# Patient Record
Sex: FEMALE | Race: OTHER | NOT HISPANIC OR LATINO
[De-identification: names, ages, dates, MRNs, and addresses within clinical notes are randomized per-mention and may not be internally consistent; named-entity substitution may affect disease eponyms.]

---

## 2018-04-19 ENCOUNTER — APPOINTMENT (OUTPATIENT)
Dept: HEART AND VASCULAR | Facility: CLINIC | Age: 68
End: 2018-04-19

## 2018-05-07 ENCOUNTER — APPOINTMENT (OUTPATIENT)
Dept: HEART AND VASCULAR | Facility: CLINIC | Age: 68
End: 2018-05-07

## 2018-06-21 ENCOUNTER — APPOINTMENT (OUTPATIENT)
Dept: HEART AND VASCULAR | Facility: CLINIC | Age: 68
End: 2018-06-21
Payer: COMMERCIAL

## 2018-06-21 VITALS
HEART RATE: 71 BPM | SYSTOLIC BLOOD PRESSURE: 120 MMHG | HEIGHT: 69 IN | DIASTOLIC BLOOD PRESSURE: 70 MMHG | BODY MASS INDEX: 30.81 KG/M2 | WEIGHT: 208 LBS

## 2018-06-21 DIAGNOSIS — I34.0 NONRHEUMATIC MITRAL (VALVE) INSUFFICIENCY: ICD-10-CM

## 2018-06-21 DIAGNOSIS — K21.9 GASTRO-ESOPHAGEAL REFLUX DISEASE W/OUT ESOPHAGITIS: ICD-10-CM

## 2018-06-21 PROCEDURE — 99204 OFFICE O/P NEW MOD 45 MIN: CPT | Mod: 25

## 2018-06-21 PROCEDURE — 93306 TTE W/DOPPLER COMPLETE: CPT

## 2018-06-21 PROCEDURE — 93000 ELECTROCARDIOGRAM COMPLETE: CPT

## 2018-07-11 ENCOUNTER — FORM ENCOUNTER (OUTPATIENT)
Age: 68
End: 2018-07-11

## 2018-07-12 ENCOUNTER — APPOINTMENT (OUTPATIENT)
Dept: CT IMAGING | Facility: HOSPITAL | Age: 68
End: 2018-07-12
Payer: COMMERCIAL

## 2018-07-12 ENCOUNTER — OUTPATIENT (OUTPATIENT)
Dept: OUTPATIENT SERVICES | Facility: HOSPITAL | Age: 68
LOS: 1 days | End: 2018-07-12
Payer: COMMERCIAL

## 2018-07-12 PROCEDURE — 75574 CT ANGIO HRT W/3D IMAGE: CPT | Mod: 26

## 2018-07-12 PROCEDURE — 75574 CT ANGIO HRT W/3D IMAGE: CPT

## 2018-07-31 ENCOUNTER — APPOINTMENT (OUTPATIENT)
Dept: PULMONOLOGY | Facility: CLINIC | Age: 68
End: 2018-07-31
Payer: COMMERCIAL

## 2018-07-31 VITALS
HEIGHT: 68 IN | SYSTOLIC BLOOD PRESSURE: 130 MMHG | DIASTOLIC BLOOD PRESSURE: 100 MMHG | HEART RATE: 80 BPM | WEIGHT: 207 LBS | BODY MASS INDEX: 31.37 KG/M2 | RESPIRATION RATE: 12 BRPM | TEMPERATURE: 98.2 F | OXYGEN SATURATION: 98 %

## 2018-07-31 DIAGNOSIS — S04.899A: ICD-10-CM

## 2018-07-31 DIAGNOSIS — Z82.49 FAMILY HISTORY OF ISCHEMIC HEART DISEASE AND OTHER DISEASES OF THE CIRCULATORY SYSTEM: ICD-10-CM

## 2018-07-31 DIAGNOSIS — Z80.0 FAMILY HISTORY OF MALIGNANT NEOPLASM OF DIGESTIVE ORGANS: ICD-10-CM

## 2018-07-31 DIAGNOSIS — J45.20 MILD INTERMITTENT ASTHMA, UNCOMPLICATED: ICD-10-CM

## 2018-07-31 DIAGNOSIS — G56.80 OTHER SPECIFIED MONONEUROPATHIES OF UNSPECIFIED UPPER LIMB: ICD-10-CM

## 2018-07-31 DIAGNOSIS — J32.9 CHRONIC SINUSITIS, UNSPECIFIED: ICD-10-CM

## 2018-07-31 DIAGNOSIS — Z82.3 FAMILY HISTORY OF STROKE: ICD-10-CM

## 2018-07-31 PROCEDURE — 99204 OFFICE O/P NEW MOD 45 MIN: CPT | Mod: 25

## 2018-07-31 PROCEDURE — 94010 BREATHING CAPACITY TEST: CPT

## 2018-07-31 PROCEDURE — 94727 GAS DIL/WSHOT DETER LNG VOL: CPT

## 2018-07-31 PROCEDURE — 94729 DIFFUSING CAPACITY: CPT

## 2018-08-09 ENCOUNTER — OTHER (OUTPATIENT)
Age: 68
End: 2018-08-09

## 2018-08-23 ENCOUNTER — CLINICAL ADVICE (OUTPATIENT)
Age: 68
End: 2018-08-23

## 2018-09-03 ENCOUNTER — FORM ENCOUNTER (OUTPATIENT)
Age: 68
End: 2018-09-03

## 2018-09-04 ENCOUNTER — OUTPATIENT (OUTPATIENT)
Dept: OUTPATIENT SERVICES | Facility: HOSPITAL | Age: 68
LOS: 1 days | End: 2018-09-04
Payer: COMMERCIAL

## 2018-09-04 ENCOUNTER — APPOINTMENT (OUTPATIENT)
Dept: PULMONOLOGY | Facility: CLINIC | Age: 68
End: 2018-09-04
Payer: COMMERCIAL

## 2018-09-04 ENCOUNTER — APPOINTMENT (OUTPATIENT)
Dept: CT IMAGING | Facility: HOSPITAL | Age: 68
End: 2018-09-04
Payer: COMMERCIAL

## 2018-09-04 VITALS
SYSTOLIC BLOOD PRESSURE: 140 MMHG | DIASTOLIC BLOOD PRESSURE: 90 MMHG | TEMPERATURE: 98 F | WEIGHT: 201.5 LBS | OXYGEN SATURATION: 98 % | HEART RATE: 71 BPM | BODY MASS INDEX: 30.54 KG/M2 | HEIGHT: 68 IN

## 2018-09-04 DIAGNOSIS — Z87.891 PERSONAL HISTORY OF NICOTINE DEPENDENCE: ICD-10-CM

## 2018-09-04 PROCEDURE — G0297: CPT

## 2018-09-04 PROCEDURE — G0297: CPT | Mod: 26

## 2018-09-04 PROCEDURE — G0296 VISIT TO DETERM LDCT ELIG: CPT

## 2018-09-10 ENCOUNTER — RESULT REVIEW (OUTPATIENT)
Age: 68
End: 2018-09-10

## 2018-09-10 DIAGNOSIS — R91.8 OTHER NONSPECIFIC ABNORMAL FINDING OF LUNG FIELD: ICD-10-CM

## 2018-09-11 PROBLEM — R91.8 MULTIPLE PULMONARY NODULES: Status: ACTIVE | Noted: 2018-09-11

## 2018-12-04 ENCOUNTER — NON-APPOINTMENT (OUTPATIENT)
Age: 68
End: 2018-12-04

## 2018-12-04 ENCOUNTER — APPOINTMENT (OUTPATIENT)
Dept: HEART AND VASCULAR | Facility: CLINIC | Age: 68
End: 2018-12-04
Payer: COMMERCIAL

## 2018-12-04 VITALS
WEIGHT: 211 LBS | BODY MASS INDEX: 31.98 KG/M2 | HEIGHT: 68 IN | HEART RATE: 73 BPM | DIASTOLIC BLOOD PRESSURE: 72 MMHG | SYSTOLIC BLOOD PRESSURE: 110 MMHG

## 2018-12-04 PROCEDURE — 99214 OFFICE O/P EST MOD 30 MIN: CPT | Mod: 25

## 2018-12-04 PROCEDURE — 93000 ELECTROCARDIOGRAM COMPLETE: CPT

## 2018-12-04 NOTE — DISCUSSION/SUMMARY
[FreeTextEntry1] : EKG:NSR,WNL\par cont Crestor for lipids/ elevated calcium score; norvasc for hptn. ? 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 advised her to bring in her BP machine at next visit.\par Her cardiac status is stable enough to permit Cataract Surgery

## 2018-12-04 NOTE — REVIEW OF SYSTEMS
[Fever] : no fever [Headache] : no headache [Recent Weight Gain (___ Lbs)] : no recent weight gain [Chills] : no chills [Feeling Fatigued] : feeling fatigued [Recent Weight Loss (___ Lbs)] : no recent weight loss [Blurry Vision] : blurred vision [Eyeglasses] : currently wearing eyeglasses [Loss Of Hearing] : hearing loss [Lower Ext Edema] : lower extremity edema [Cough] : no cough [Wheezing] : wheezing [Coughing Up Blood] : no hemoptysis [Abdominal Pain] : no abdominal pain [Nausea] : no nausea [Vomiting] : no vomiting [Heartburn] : heartburn [Change in Appetite] : no change in appetite [Dysphagia] : no dysphagia [Dysuria] : no dysuria [Joint Pain] : joint pain [Joint Swelling] : no joint swelling [Joint Stiffness] : no joint stiffness [see HPI] : see HPI [Dizziness] : no dizziness [Numbness (Hypesthesia)] : numbness [Tingling (Paresthesia)] : tingling [Confusion] : no confusion was observed [Memory Lapses Or Loss] : memory lapses or loss [Depression] : no depression [Anxiety] : no anxiety [Under Stress] : under stress [Suicidal] : not suicidal [Negative] : Heme/Lymph

## 2018-12-04 NOTE — HISTORY OF PRESENT ILLNESS
[FreeTextEntry1] : No cp,sob. for Left cataract surgery. to see PCP and get labs. she saw Dr Dozier for dilated PA. Gets occasional leg swelling and leg numbness- has L/S problems. states at times her BP is occasionally high

## 2018-12-04 NOTE — PHYSICAL EXAM
[General Appearance - Well Developed] : well developed [Normal Appearance] : normal appearance [Well Groomed] : well groomed [General Appearance - Well Nourished] : well nourished [No Deformities] : no deformities [General Appearance - In No Acute Distress] : no acute distress [Normal Conjunctiva] : the conjunctiva exhibited no abnormalities [Normal Oral Mucosa] : normal oral mucosa [Normal Jugular Venous A Waves Present] : normal jugular venous A waves present [Normal Jugular Venous V Waves Present] : normal jugular venous V waves present [No Jugular Venous Knight A Waves] : no jugular venous knight A waves [FreeTextEntry1] : carotids w/o bruits [] : no respiratory distress [Respiration, Rhythm And Depth] : normal respiratory rhythm and effort [Exaggerated Use Of Accessory Muscles For Inspiration] : no accessory muscle use [Auscultation Breath Sounds / Voice Sounds] : lungs were clear to auscultation bilaterally [Heart Rate And Rhythm] : heart rate and rhythm were normal [Heart Sounds] : normal S1 and S2 [Murmurs] : no murmurs present [Arterial Pulses Normal] : the arterial pulses were normal [Edema] : no peripheral edema present [Bowel Sounds] : normal bowel sounds [Abdomen Soft] : soft [Abdomen Tenderness] : non-tender [Abnormal Walk] : normal gait [Nail Clubbing] : no clubbing of the fingernails [Skin Color & Pigmentation] : normal skin color and pigmentation [Oriented To Time, Place, And Person] : oriented to person, place, and time

## 2018-12-19 ENCOUNTER — APPOINTMENT (OUTPATIENT)
Dept: HEART AND VASCULAR | Facility: CLINIC | Age: 68
End: 2018-12-19
Payer: COMMERCIAL

## 2018-12-19 VITALS
SYSTOLIC BLOOD PRESSURE: 130 MMHG | HEART RATE: 74 BPM | DIASTOLIC BLOOD PRESSURE: 80 MMHG | BODY MASS INDEX: 32.23 KG/M2 | WEIGHT: 212 LBS

## 2018-12-19 DIAGNOSIS — I83.899 VARICOSE VEINS OF UNSPECIFIED LOWER EXTREMITY WITH OTHER COMPLICATIONS: ICD-10-CM

## 2018-12-19 DIAGNOSIS — M79.604 PAIN IN RIGHT LEG: ICD-10-CM

## 2018-12-19 DIAGNOSIS — R20.0 ANESTHESIA OF SKIN: ICD-10-CM

## 2018-12-19 DIAGNOSIS — M79.605 PAIN IN RIGHT LEG: ICD-10-CM

## 2018-12-19 PROCEDURE — 93970 EXTREMITY STUDY: CPT

## 2018-12-19 PROCEDURE — 99214 OFFICE O/P EST MOD 30 MIN: CPT | Mod: 25

## 2019-01-06 PROBLEM — M79.604 LEG PAIN, BILATERAL: Status: ACTIVE | Noted: 2019-01-06

## 2019-01-06 PROBLEM — I83.899 SYMPTOMATIC VARICOSE VEINS: Status: ACTIVE | Noted: 2019-01-06

## 2019-01-06 PROBLEM — R20.0 LEG NUMBNESS: Status: ACTIVE | Noted: 2019-01-06

## 2019-06-03 ENCOUNTER — APPOINTMENT (OUTPATIENT)
Dept: HEART AND VASCULAR | Facility: CLINIC | Age: 69
End: 2019-06-03

## 2019-09-14 ENCOUNTER — APPOINTMENT (OUTPATIENT)
Dept: CT IMAGING | Facility: HOSPITAL | Age: 69
End: 2019-09-14

## 2019-09-22 ENCOUNTER — FORM ENCOUNTER (OUTPATIENT)
Age: 69
End: 2019-09-22

## 2019-09-23 ENCOUNTER — APPOINTMENT (OUTPATIENT)
Dept: CT IMAGING | Facility: HOSPITAL | Age: 69
End: 2019-09-23
Payer: COMMERCIAL

## 2019-09-23 ENCOUNTER — OUTPATIENT (OUTPATIENT)
Dept: OUTPATIENT SERVICES | Facility: HOSPITAL | Age: 69
LOS: 1 days | End: 2019-09-23
Payer: COMMERCIAL

## 2019-09-23 PROCEDURE — 71250 CT THORAX DX C-: CPT

## 2019-09-23 PROCEDURE — 71250 CT THORAX DX C-: CPT | Mod: 26

## 2019-09-24 ENCOUNTER — APPOINTMENT (OUTPATIENT)
Dept: HEART AND VASCULAR | Facility: CLINIC | Age: 69
End: 2019-09-24
Payer: COMMERCIAL

## 2019-09-24 ENCOUNTER — NON-APPOINTMENT (OUTPATIENT)
Age: 69
End: 2019-09-24

## 2019-09-24 VITALS
BODY MASS INDEX: 31.67 KG/M2 | SYSTOLIC BLOOD PRESSURE: 130 MMHG | DIASTOLIC BLOOD PRESSURE: 86 MMHG | WEIGHT: 209 LBS | HEART RATE: 73 BPM | OXYGEN SATURATION: 97 % | HEIGHT: 68 IN

## 2019-09-24 VITALS — SYSTOLIC BLOOD PRESSURE: 112 MMHG | DIASTOLIC BLOOD PRESSURE: 78 MMHG

## 2019-09-24 PROCEDURE — 99214 OFFICE O/P EST MOD 30 MIN: CPT | Mod: 25

## 2019-09-24 PROCEDURE — 93000 ELECTROCARDIOGRAM COMPLETE: CPT

## 2019-09-24 NOTE — DISCUSSION/SUMMARY
[FreeTextEntry1] : EKG:NSR,WNL.\par Feel her boothe is due to weight/ pulmonary( former smoker) f/u with Dr Dozier\par echo for BOOTHE\par get labs done recently from PCP( given elevated calcium score her target LDL. 70mg%\par cont Crestor for lipids. repeat UP=285/78 and cont norvasc for HPTN

## 2019-09-24 NOTE — PHYSICAL EXAM
[General Appearance - Well Developed] : well developed [Normal Appearance] : normal appearance [Well Groomed] : well groomed In an event that you cannot reach your surgeon you can call [phone] to page the pediatric surgical resident or in an emergency go to the closest ER. If you have any questions you may contact the [facility]  [phone] mon-fri 6a-4p. [General Appearance - Well Nourished] : well nourished [No Deformities] : no deformities [General Appearance - In No Acute Distress] : no acute distress [Normal Oral Mucosa] : normal oral mucosa [Normal Conjunctiva] : the conjunctiva exhibited no abnormalities [Normal Jugular Venous A Waves Present] : normal jugular venous A waves present [No Jugular Venous Knight A Waves] : no jugular venous knight A waves [Normal Jugular Venous V Waves Present] : normal jugular venous V waves present [] : no respiratory distress [Respiration, Rhythm And Depth] : normal respiratory rhythm and effort [Exaggerated Use Of Accessory Muscles For Inspiration] : no accessory muscle use [Auscultation Breath Sounds / Voice Sounds] : lungs were clear to auscultation bilaterally [Heart Sounds] : normal S1 and S2 [Heart Rate And Rhythm] : heart rate and rhythm were normal [Murmurs] : no murmurs present [Arterial Pulses Normal] : the arterial pulses were normal [Bowel Sounds] : normal bowel sounds [Edema] : no peripheral edema present [Abdomen Soft] : soft [Abdomen Tenderness] : non-tender [Abnormal Walk] : normal gait [Nail Clubbing] : no clubbing of the fingernails [Skin Color & Pigmentation] : normal skin color and pigmentation [Oriented To Time, Place, And Person] : oriented to person, place, and time [FreeTextEntry1] : carotids w/o bruits

## 2019-09-24 NOTE — REVIEW OF SYSTEMS
[Headache] : headache [Feeling Fatigued] : feeling fatigued [Blurry Vision] : blurred vision [Eyeglasses] : currently wearing eyeglasses [Loss Of Hearing] : hearing loss [Sinus Pressure] : sinus pressure [Lower Ext Edema] : lower extremity edema [Wheezing] : wheezing [Cough] : cough [Heartburn] : heartburn [Joint Pain] : joint pain [see HPI] : see HPI [Numbness (Hypesthesia)] : numbness [Tingling (Paresthesia)] : tingling [Memory Lapses Or Loss] : memory lapses or loss [Under Stress] : under stress [Negative] : Heme/Lymph [Recent Weight Gain (___ Lbs)] : no recent weight gain [Fever] : no fever [Chills] : no chills [Recent Weight Loss (___ Lbs)] : no recent weight loss [Coughing Up Blood] : no hemoptysis [Abdominal Pain] : no abdominal pain [Vomiting] : no vomiting [Nausea] : no nausea [Change in Appetite] : no change in appetite [Dysphagia] : no dysphagia [Dysuria] : no dysuria [Joint Stiffness] : no joint stiffness [Joint Swelling] : no joint swelling [Confusion] : no confusion was observed [Dizziness] : no dizziness [Anxiety] : no anxiety [Depression] : no depression [Suicidal] : not suicidal

## 2019-09-24 NOTE — HISTORY OF PRESENT ILLNESS
[FreeTextEntry1] : walked here from [facility]- gets a bit winded with stairs, no cp\par c/o numbness in both feet(L.R) constant. has L/S disease

## 2020-06-23 ENCOUNTER — APPOINTMENT (OUTPATIENT)
Dept: HEART AND VASCULAR | Facility: CLINIC | Age: 70
End: 2020-06-23
Payer: COMMERCIAL

## 2020-06-23 ENCOUNTER — NON-APPOINTMENT (OUTPATIENT)
Age: 70
End: 2020-06-23

## 2020-06-23 VITALS
TEMPERATURE: 97.8 F | SYSTOLIC BLOOD PRESSURE: 132 MMHG | WEIGHT: 208 LBS | BODY MASS INDEX: 31.52 KG/M2 | HEIGHT: 68 IN | HEART RATE: 68 BPM | DIASTOLIC BLOOD PRESSURE: 90 MMHG

## 2020-06-23 DIAGNOSIS — R94.31 ABNORMAL ELECTROCARDIOGRAM [ECG] [EKG]: ICD-10-CM

## 2020-06-23 PROCEDURE — 99214 OFFICE O/P EST MOD 30 MIN: CPT | Mod: 25

## 2020-06-23 PROCEDURE — 93000 ELECTROCARDIOGRAM COMPLETE: CPT

## 2020-06-23 RX ORDER — ALPRAZOLAM 2 MG/1
TABLET ORAL
Refills: 0 | Status: DISCONTINUED | COMMUNITY
End: 2020-06-23

## 2020-06-23 NOTE — HISTORY OF PRESENT ILLNESS
[FreeTextEntry1] : Have COVIDS early April- no sob- her BP was elevated and her PCP increased Norvasc about 2 weeks ago. gets her (chronic chest pressure) when stressed (has it for years)

## 2020-06-23 NOTE — REVIEW OF SYSTEMS
[Fever] : no fever [Headache] : headache [Recent Weight Gain (___ Lbs)] : no recent weight gain [Feeling Fatigued] : not feeling fatigued [Chills] : no chills [Recent Weight Loss (___ Lbs)] : no recent weight loss [see HPI] : see HPI [Blurry Vision] : no blurred vision [Eyeglasses] : currently wearing eyeglasses [Loss Of Hearing] : hearing loss [Chest  Pressure] : chest pressure [Sinus Pressure] : no sinus pressure [Lower Ext Edema] : no extremity edema [Cough] : no cough [Wheezing] : no wheezing [Coughing Up Blood] : no hemoptysis [Nausea] : no nausea [Abdominal Pain] : no abdominal pain [Vomiting] : no vomiting [Heartburn] : heartburn [Dysphagia] : no dysphagia [Dysuria] : no dysuria [Change in Appetite] : no change in appetite [Joint Swelling] : no joint swelling [Joint Pain] : joint pain [Joint Stiffness] : no joint stiffness [Dizziness] : no dizziness [Numbness (Hypesthesia)] : no numbness [Tingling (Paresthesia)] : tingling [Memory Lapses Or Loss] : memory lapses or loss [Confusion] : no confusion was observed [Under Stress] : under stress [Anxiety] : anxiety [Depression] : no depression [Suicidal] : not suicidal [Negative] : Heme/Lymph

## 2020-06-23 NOTE — PHYSICAL EXAM
[Well Groomed] : well groomed [General Appearance - Well Developed] : well developed [Normal Appearance] : normal appearance [General Appearance - Well Nourished] : well nourished [No Deformities] : no deformities [General Appearance - In No Acute Distress] : no acute distress [Normal Oral Mucosa] : normal oral mucosa [Normal Conjunctiva] : the conjunctiva exhibited no abnormalities [Normal Jugular Venous A Waves Present] : normal jugular venous A waves present [Normal Jugular Venous V Waves Present] : normal jugular venous V waves present [No Jugular Venous Knight A Waves] : no jugular venous knight A waves [FreeTextEntry1] : carotids w/o bruits [] : no respiratory distress [Respiration, Rhythm And Depth] : normal respiratory rhythm and effort [Exaggerated Use Of Accessory Muscles For Inspiration] : no accessory muscle use [Auscultation Breath Sounds / Voice Sounds] : lungs were clear to auscultation bilaterally [Heart Rate And Rhythm] : heart rate and rhythm were normal [Heart Sounds] : normal S1 and S2 [Murmurs] : no murmurs present [Arterial Pulses Normal] : the arterial pulses were normal [Edema] : no peripheral edema present [Abdomen Soft] : soft [Bowel Sounds] : normal bowel sounds [Abdomen Tenderness] : non-tender [Abnormal Walk] : normal gait [Nail Clubbing] : no clubbing of the fingernails [Skin Color & Pigmentation] : normal skin color and pigmentation [Oriented To Time, Place, And Person] : oriented to person, place, and time

## 2020-06-23 NOTE — DISCUSSION/SUMMARY
[FreeTextEntry1] : EKG:NSR, inferior Q waves(mild change)\par BP high- will add ACE and cont norvasc for hPTN; Crestor for lipids- will get nuc stress for CP( doubt ischemia- neg CCTA)

## 2020-06-27 ENCOUNTER — HOSPITAL ENCOUNTER (EMERGENCY)
Dept: HOSPITAL 74 - JER | Age: 70
Discharge: HOME | End: 2020-06-27
Payer: COMMERCIAL

## 2020-06-27 VITALS — DIASTOLIC BLOOD PRESSURE: 68 MMHG | SYSTOLIC BLOOD PRESSURE: 138 MMHG | HEART RATE: 82 BPM

## 2020-06-27 VITALS — TEMPERATURE: 98 F

## 2020-06-27 VITALS — BODY MASS INDEX: 31.6 KG/M2

## 2020-06-27 DIAGNOSIS — R51: Primary | ICD-10-CM
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
## 2020-07-16 ENCOUNTER — APPOINTMENT (OUTPATIENT)
Dept: HEART AND VASCULAR | Facility: CLINIC | Age: 70
End: 2020-07-16
Payer: COMMERCIAL

## 2020-07-16 VITALS
WEIGHT: 207 LBS | HEIGHT: 68 IN | BODY MASS INDEX: 31.37 KG/M2 | HEART RATE: 81 BPM | SYSTOLIC BLOOD PRESSURE: 118 MMHG | DIASTOLIC BLOOD PRESSURE: 84 MMHG

## 2020-07-16 VITALS — TEMPERATURE: 97.7 F

## 2020-07-16 DIAGNOSIS — I37.1 NONRHEUMATIC PULMONARY VALVE INSUFFICIENCY: ICD-10-CM

## 2020-07-16 DIAGNOSIS — I51.7 CARDIOMEGALY: ICD-10-CM

## 2020-07-16 PROCEDURE — 99213 OFFICE O/P EST LOW 20 MIN: CPT | Mod: 25

## 2020-07-16 PROCEDURE — 93015 CV STRESS TEST SUPVJ I&R: CPT

## 2020-07-16 PROCEDURE — 93306 TTE W/DOPPLER COMPLETE: CPT

## 2020-07-16 RX ORDER — AMLODIPINE BESYLATE 5 MG/1
5 TABLET ORAL DAILY
Qty: 1 | Refills: 1 | Status: ACTIVE | COMMUNITY
Start: 1900-01-01 | End: 1900-01-01

## 2020-07-16 NOTE — DISCUSSION/SUMMARY
[FreeTextEntry1] : negative stress test but poor exercise tolerance- needs to lose weight/ exercise. cont crestor for liipds; norvasc + lisinopril for HPTN\par echo:\par LVH,normal LVEF,minimal AR/TR/DE\par to get labs done recently by PCP\par meds renewed

## 2020-07-16 NOTE — PHYSICAL EXAM
[General Appearance - Well Developed] : well developed [Normal Appearance] : normal appearance [Well Groomed] : well groomed [General Appearance - Well Nourished] : well nourished [No Deformities] : no deformities [General Appearance - In No Acute Distress] : no acute distress [Normal Conjunctiva] : the conjunctiva exhibited no abnormalities [Normal Jugular Venous A Waves Present] : normal jugular venous A waves present [Normal Jugular Venous V Waves Present] : normal jugular venous V waves present [No Jugular Venous Knight A Waves] : no jugular venous knight A waves [] : no respiratory distress [Respiration, Rhythm And Depth] : normal respiratory rhythm and effort [Exaggerated Use Of Accessory Muscles For Inspiration] : no accessory muscle use [Auscultation Breath Sounds / Voice Sounds] : lungs were clear to auscultation bilaterally [Murmurs] : no murmurs present [Heart Rate And Rhythm] : heart rate and rhythm were normal [Heart Sounds] : normal S1 and S2 [Edema] : no peripheral edema present [Arterial Pulses Normal] : the arterial pulses were normal [Abnormal Walk] : normal gait [Nail Clubbing] : no clubbing of the fingernails [Abdomen Soft] : soft [Oriented To Time, Place, And Person] : oriented to person, place, and time [FreeTextEntry1] : carotids w/o bruits

## 2020-10-19 ENCOUNTER — APPOINTMENT (OUTPATIENT)
Dept: HEART AND VASCULAR | Facility: CLINIC | Age: 70
End: 2020-10-19

## 2020-12-31 PROBLEM — G56.80 PHRENIC NERVE PALSY: Status: ACTIVE | Noted: 2018-07-31

## 2022-06-22 ENCOUNTER — APPOINTMENT (OUTPATIENT)
Dept: OPHTHALMOLOGY | Facility: CLINIC | Age: 72
End: 2022-06-22

## 2022-06-22 ENCOUNTER — OUTPATIENT (OUTPATIENT)
Dept: OUTPATIENT SERVICES | Facility: HOSPITAL | Age: 72
LOS: 1 days | End: 2022-06-22

## 2022-06-23 DIAGNOSIS — H26.491 OTHER SECONDARY CATARACT, RIGHT EYE: ICD-10-CM

## 2022-06-24 ENCOUNTER — TRANSCRIPTION ENCOUNTER (OUTPATIENT)
Age: 72
End: 2022-06-24

## 2022-07-07 ENCOUNTER — APPOINTMENT (OUTPATIENT)
Dept: OPHTHALMOLOGY | Facility: CLINIC | Age: 72
End: 2022-07-07

## 2022-07-07 ENCOUNTER — OUTPATIENT (OUTPATIENT)
Dept: OUTPATIENT SERVICES | Facility: HOSPITAL | Age: 72
LOS: 1 days | End: 2022-07-07

## 2022-07-08 DIAGNOSIS — H26.492 OTHER SECONDARY CATARACT, LEFT EYE: ICD-10-CM

## 2022-12-01 ENCOUNTER — APPOINTMENT (OUTPATIENT)
Dept: HEART AND VASCULAR | Facility: CLINIC | Age: 72
End: 2022-12-01

## 2022-12-01 ENCOUNTER — NON-APPOINTMENT (OUTPATIENT)
Age: 72
End: 2022-12-01

## 2022-12-01 VITALS
BODY MASS INDEX: 31.07 KG/M2 | HEART RATE: 70 BPM | OXYGEN SATURATION: 98 % | TEMPERATURE: 97.6 F | DIASTOLIC BLOOD PRESSURE: 79 MMHG | HEIGHT: 68 IN | SYSTOLIC BLOOD PRESSURE: 125 MMHG | WEIGHT: 205 LBS

## 2022-12-01 DIAGNOSIS — R07.89 OTHER CHEST PAIN: ICD-10-CM

## 2022-12-01 DIAGNOSIS — R06.02 SHORTNESS OF BREATH: ICD-10-CM

## 2022-12-01 DIAGNOSIS — M54.2 CERVICALGIA: ICD-10-CM

## 2022-12-01 DIAGNOSIS — R06.09 OTHER FORMS OF DYSPNEA: ICD-10-CM

## 2022-12-01 DIAGNOSIS — I07.1 RHEUMATIC TRICUSPID INSUFFICIENCY: ICD-10-CM

## 2022-12-01 PROCEDURE — 93306 TTE W/DOPPLER COMPLETE: CPT

## 2022-12-01 PROCEDURE — 36415 COLL VENOUS BLD VENIPUNCTURE: CPT

## 2022-12-01 PROCEDURE — 99214 OFFICE O/P EST MOD 30 MIN: CPT | Mod: 25

## 2022-12-01 PROCEDURE — 93000 ELECTROCARDIOGRAM COMPLETE: CPT

## 2022-12-01 RX ORDER — FLUTICASONE PROPIONATE 50 UG/1
50 SPRAY, METERED NASAL
Qty: 16 | Refills: 0 | Status: ACTIVE | COMMUNITY
Start: 2022-11-10

## 2022-12-01 RX ORDER — DIAZEPAM 5 MG/1
5 TABLET ORAL
Qty: 30 | Refills: 0 | Status: ACTIVE | COMMUNITY
Start: 2022-09-21

## 2022-12-01 RX ORDER — LISINOPRIL 5 MG/1
5 TABLET ORAL
Qty: 1 | Refills: 1 | Status: COMPLETED | COMMUNITY
Start: 2020-06-23 | End: 2022-12-01

## 2022-12-01 RX ORDER — ALBUTEROL SULFATE 90 UG/1
108 (90 BASE) INHALANT RESPIRATORY (INHALATION)
Qty: 8 | Refills: 0 | Status: ACTIVE | COMMUNITY
Start: 2022-11-17

## 2022-12-01 NOTE — HISTORY OF PRESENT ILLNESS
[FreeTextEntry1] : Since her last visit, patient reports continued central chest "tightness" or "heaviness" lasting several to 30 minutes at a time. Nonexertional. It is brought on by feeling overwhelmed with external life stressors. Pt. thinks she is anxious.\par \par Pt. denies any orthopnea, PND, palpitations, lightheadedness, syncope or lower extremity edema. \par \par - She has been off the Lisinopril for several years - it made her fatigued\par - She is taking Aleve or Tylenol daily for cervical neck and head pain \par - She saw Dr. Kirt Bagley to establish primary care - blood work 05/2022\par \par Activity: She is working with a PT for her spinal issues/pain twice/week. She tries to walk regularly but finds her activity is limited by her cervical pain. No exertional chest pain when walking. She does admit to some shortness of breath when climbing stairs. \par \par =============================\par Stress EKG (07/2020): reduced exercise tolerance however normal EKG stress test \par ECHO (07/2020): mild LVH, normal LVSF with EF 60%, moderate diastolic dysfunction (stage II), normal pulm pressures

## 2022-12-01 NOTE — DISCUSSION/SUMMARY
[FreeTextEntry1] : EKG today: atrial rhythm\par TTE:mild AR/ID/TR\par \par HTN: well-controlled. Continue Amlodipine 5 mg QD for BP control \par IRVIN: Repeat echo today. Last echo (07/2020) revealed mild LVH, normal LVSF with EF 60% and moderate diastolic dysfunction (stage II). Last stress EKG (07/2020) negative for ischemic changes - was dx with COPD in past so feel IRVIN is pulmonary\par HLD: Continue Crestor 10 mg QD and check lipids/elevated calcium score; \par Norvasc for HPTN\par feel CP is non cardiac  negative CCTA for same Sx in past/non exertional

## 2022-12-01 NOTE — REVIEW OF SYSTEMS
[Dyspnea on exertion] : dyspnea during exertion [Fever] : no fever [Chills] : no chills [SOB] : no shortness of breath [Chest Discomfort] : no chest discomfort [Lower Ext Edema] : no extremity edema [Leg Claudication] : no intermittent leg claudication [Palpitations] : no palpitations [Orthopnea] : no orthopnea [PND] : no PND [Syncope] : no syncope [Cough] : no cough [Dizziness] : no dizziness

## 2022-12-01 NOTE — ASSESSMENT
[FreeTextEntry1] : Pt. is a 72 year old F with PMHx of HTN (with evidence of LVH on last echo (07/2020)), HLD and varicose veins who presents today for follow-up after > 2 years and echocardiogram.

## 2022-12-01 NOTE — REASON FOR VISIT
[Structural Heart and Valve Disease] : structural heart and valve disease [Hyperlipidemia] : hyperlipidemia [Hypertension] : hypertension [FreeTextEntry1] : Pt. is a 72 year old F with PMHX of HTN (with evidence of LVH on last echo (07/2020)), HLD and varicose veins who presents today for follow-up after > 2 years and echocardiogram.

## 2022-12-01 NOTE — PHYSICAL EXAM
[Normal Conjunctiva] : normal conjunctiva [Normal Venous Pressure] : normal venous pressure [No Carotid Bruit] : no carotid bruit [Normal S1, S2] : normal S1, S2 [No Murmur] : no murmur [No Edema] : no edema [Normal] : alert and oriented, normal memory

## 2022-12-02 LAB
ALBUMIN SERPL ELPH-MCNC: 4.6 G/DL
ALP BLD-CCNC: 110 U/L
ALT SERPL-CCNC: 18 U/L
ANION GAP SERPL CALC-SCNC: 11 MMOL/L
AST SERPL-CCNC: 25 U/L
BILIRUB SERPL-MCNC: 0.4 MG/DL
BUN SERPL-MCNC: 16 MG/DL
CALCIUM SERPL-MCNC: 10.3 MG/DL
CHLORIDE SERPL-SCNC: 100 MMOL/L
CHOLEST SERPL-MCNC: 177 MG/DL
CO2 SERPL-SCNC: 26 MMOL/L
CREAT SERPL-MCNC: 0.69 MG/DL
EGFR: 91 ML/MIN/1.73M2
GLUCOSE SERPL-MCNC: 106 MG/DL
HDLC SERPL-MCNC: 83 MG/DL
LDLC SERPL CALC-MCNC: 74 MG/DL
NONHDLC SERPL-MCNC: 94 MG/DL
POTASSIUM SERPL-SCNC: 4.5 MMOL/L
PROT SERPL-MCNC: 7.4 G/DL
SODIUM SERPL-SCNC: 136 MMOL/L
TRIGL SERPL-MCNC: 100 MG/DL

## 2024-01-10 ENCOUNTER — NON-APPOINTMENT (OUTPATIENT)
Age: 74
End: 2024-01-10

## 2024-02-27 ENCOUNTER — APPOINTMENT (OUTPATIENT)
Dept: HEART AND VASCULAR | Facility: CLINIC | Age: 74
End: 2024-02-27
Payer: MEDICARE

## 2024-02-27 VITALS
OXYGEN SATURATION: 98 % | BODY MASS INDEX: 29.86 KG/M2 | HEART RATE: 70 BPM | HEIGHT: 68 IN | WEIGHT: 197 LBS | SYSTOLIC BLOOD PRESSURE: 127 MMHG | DIASTOLIC BLOOD PRESSURE: 84 MMHG

## 2024-02-27 DIAGNOSIS — I10 ESSENTIAL (PRIMARY) HYPERTENSION: ICD-10-CM

## 2024-02-27 DIAGNOSIS — Z01.810 ENCOUNTER FOR PREPROCEDURAL CARDIOVASCULAR EXAMINATION: ICD-10-CM

## 2024-02-27 DIAGNOSIS — I35.1 NONRHEUMATIC AORTIC (VALVE) INSUFFICIENCY: ICD-10-CM

## 2024-02-27 DIAGNOSIS — R93.1 ABNORMAL FINDINGS ON DIAGNOSTIC IMAGING OF HEART AND CORONARY CIRCULATION: ICD-10-CM

## 2024-02-27 DIAGNOSIS — E78.5 HYPERLIPIDEMIA, UNSPECIFIED: ICD-10-CM

## 2024-02-27 PROCEDURE — 93306 TTE W/DOPPLER COMPLETE: CPT

## 2024-02-27 PROCEDURE — 99214 OFFICE O/P EST MOD 30 MIN: CPT | Mod: 25

## 2024-02-27 PROCEDURE — 93000 ELECTROCARDIOGRAM COMPLETE: CPT

## 2024-02-27 NOTE — DISCUSSION/SUMMARY
[FreeTextEntry1] : EKG today: NSR,WNL  TTE:Normal LVEF,LVH,mild AR/MR/TR/MA reviewed labs done [place] LDL=80mG% continue amlodipine for HPTN;Crestor for HLD/elevated calcium score Her cardiac status is stable enough to permit TKR

## 2024-02-27 NOTE — REASON FOR VISIT
[Hyperlipidemia] : hyperlipidemia [Hypertension] : hypertension [FreeTextEntry1] : Pt. is a 73-year-old F with PMHx of HTN (with evidence of LVH on echo 07/2020), HLD and varicose veins who presents today to re-establish care since 12/2022.

## 2024-02-27 NOTE — ASSESSMENT
[FreeTextEntry1] : Pt. is a 73-year-old F with PMHx of HTN (with evidence of LVH on echo 07/2020), HLD and varicose veins who presents today to re-establish care since 12/2022.

## 2024-02-27 NOTE — HISTORY OF PRESENT ILLNESS
[FreeTextEntry1] : Since her last visit,  Pt. denies any chest pain, dyspnea, orthopnea, PND, palpitations, lightheadedness, syncope or lower extremity edema.  scheduled for RTKR  ================================= Labs/Diagnostics:  2022 Lipid profile: T, TC: 177, HDL: 83, LDL: 74 K/Creat: 4.5/0.69  Echo (2022): EF 55-60%, normal RV size and function, trace AR.   Stress EKG (2020): reduced exercise tolerance however normal EKG stress test.   ECHO (2020): mild LVH, normal LVSF with EF 60%, moderate diastolic dysfunction (stage II), normal pulmonary pressures.

## 2024-02-27 NOTE — PHYSICAL EXAM
[Well Developed] : well developed [Well Nourished] : well nourished [No Acute Distress] : no acute distress [Normal Conjunctiva] : normal conjunctiva [Normal Venous Pressure] : normal venous pressure [No Carotid Bruit] : no carotid bruit [Normal S1, S2] : normal S1, S2 [No Murmur] : no murmur [No Rub] : no rub [No Gallop] : no gallop [Clear Lung Fields] : clear lung fields [Good Air Entry] : good air entry [No Respiratory Distress] : no respiratory distress  [Soft] : abdomen soft [Non Tender] : non-tender [Normal Bowel Sounds] : normal bowel sounds [No Edema] : no edema [No Cyanosis] : no cyanosis [No Clubbing] : no clubbing [No Rash] : no rash [Moves all extremities] : moves all extremities [Normal Speech] : normal speech [Alert and Oriented] : alert and oriented [de-identified] : using cane [de-identified] : brace on right knee

## 2024-09-26 ENCOUNTER — APPOINTMENT (OUTPATIENT)
Dept: HEART AND VASCULAR | Facility: CLINIC | Age: 74
End: 2024-09-26

## 2024-12-16 ENCOUNTER — APPOINTMENT (OUTPATIENT)
Dept: HEART AND VASCULAR | Facility: CLINIC | Age: 74
End: 2024-12-16
Payer: MEDICARE

## 2024-12-16 VITALS
BODY MASS INDEX: 30.81 KG/M2 | HEART RATE: 80 BPM | SYSTOLIC BLOOD PRESSURE: 123 MMHG | OXYGEN SATURATION: 99 % | TEMPERATURE: 97.7 F | HEIGHT: 66.5 IN | DIASTOLIC BLOOD PRESSURE: 80 MMHG | WEIGHT: 194 LBS

## 2024-12-16 DIAGNOSIS — E78.5 HYPERLIPIDEMIA, UNSPECIFIED: ICD-10-CM

## 2024-12-16 DIAGNOSIS — I70.0 ATHEROSCLEROSIS OF AORTA: ICD-10-CM

## 2024-12-16 DIAGNOSIS — R93.1 ABNORMAL FINDINGS ON DIAGNOSTIC IMAGING OF HEART AND CORONARY CIRCULATION: ICD-10-CM

## 2024-12-16 PROCEDURE — G2211 COMPLEX E/M VISIT ADD ON: CPT

## 2024-12-16 PROCEDURE — 99214 OFFICE O/P EST MOD 30 MIN: CPT

## 2024-12-16 PROCEDURE — 93000 ELECTROCARDIOGRAM COMPLETE: CPT

## 2024-12-16 RX ORDER — FLUTICASONE PROPIONATE AND SALMETEROL XINAFOATE 230; 21 UG/1; UG/1
AEROSOL, METERED RESPIRATORY (INHALATION)
Refills: 0 | Status: ACTIVE | COMMUNITY

## 2025-03-11 ENCOUNTER — RESULT REVIEW (OUTPATIENT)
Age: 75
End: 2025-03-11

## 2025-03-12 ENCOUNTER — RESULT REVIEW (OUTPATIENT)
Age: 75
End: 2025-03-12